# Patient Record
Sex: MALE | Race: WHITE
[De-identification: names, ages, dates, MRNs, and addresses within clinical notes are randomized per-mention and may not be internally consistent; named-entity substitution may affect disease eponyms.]

---

## 2018-09-04 PROBLEM — Z00.00 ENCOUNTER FOR PREVENTIVE HEALTH EXAMINATION: Status: ACTIVE | Noted: 2018-09-04

## 2018-10-03 LAB — HBA1C MFR BLD HPLC: 5.2

## 2018-10-04 ENCOUNTER — APPOINTMENT (OUTPATIENT)
Dept: ENDOCRINOLOGY | Facility: CLINIC | Age: 58
End: 2018-10-04
Payer: COMMERCIAL

## 2018-10-04 VITALS
DIASTOLIC BLOOD PRESSURE: 80 MMHG | BODY MASS INDEX: 44.1 KG/M2 | HEART RATE: 80 BPM | HEIGHT: 71 IN | WEIGHT: 315 LBS | SYSTOLIC BLOOD PRESSURE: 130 MMHG

## 2018-10-04 DIAGNOSIS — I10 ESSENTIAL (PRIMARY) HYPERTENSION: ICD-10-CM

## 2018-10-04 DIAGNOSIS — Z86.19 PERSONAL HISTORY OF OTHER INFECTIOUS AND PARASITIC DISEASES: ICD-10-CM

## 2018-10-04 PROCEDURE — 99214 OFFICE O/P EST MOD 30 MIN: CPT

## 2018-11-07 ENCOUNTER — MEDICATION RENEWAL (OUTPATIENT)
Age: 58
End: 2018-11-07

## 2019-01-22 ENCOUNTER — RECORD ABSTRACTING (OUTPATIENT)
Age: 59
End: 2019-01-22

## 2019-02-04 ENCOUNTER — MEDICATION RENEWAL (OUTPATIENT)
Age: 59
End: 2019-02-04

## 2019-02-06 LAB
CREAT SERPL-MCNC: 0.87
GLUCOSE SERPL-MCNC: 102
HBA1C MFR BLD HPLC: 5.4

## 2019-02-07 ENCOUNTER — APPOINTMENT (OUTPATIENT)
Dept: ENDOCRINOLOGY | Facility: CLINIC | Age: 59
End: 2019-02-07
Payer: COMMERCIAL

## 2019-02-07 VITALS
OXYGEN SATURATION: 97 % | HEART RATE: 79 BPM | HEIGHT: 71 IN | BODY MASS INDEX: 44.1 KG/M2 | WEIGHT: 315 LBS | SYSTOLIC BLOOD PRESSURE: 110 MMHG | DIASTOLIC BLOOD PRESSURE: 70 MMHG

## 2019-02-07 DIAGNOSIS — G25.0 ESSENTIAL TREMOR: ICD-10-CM

## 2019-02-07 PROCEDURE — 99214 OFFICE O/P EST MOD 30 MIN: CPT

## 2019-02-07 RX ORDER — CALCITRIOL 0.25 UG/1
0.25 CAPSULE, LIQUID FILLED ORAL
Qty: 180 | Refills: 0 | Status: DISCONTINUED | COMMUNITY
End: 2019-02-07

## 2019-04-09 ENCOUNTER — APPOINTMENT (OUTPATIENT)
Dept: ENDOCRINOLOGY | Facility: CLINIC | Age: 59
End: 2019-04-09

## 2019-05-23 ENCOUNTER — RX RENEWAL (OUTPATIENT)
Age: 59
End: 2019-05-23

## 2019-06-12 ENCOUNTER — APPOINTMENT (OUTPATIENT)
Dept: ENDOCRINOLOGY | Facility: CLINIC | Age: 59
End: 2019-06-12
Payer: COMMERCIAL

## 2019-06-12 VITALS
BODY MASS INDEX: 0.13 KG/M2 | DIASTOLIC BLOOD PRESSURE: 86 MMHG | HEART RATE: 74 BPM | HEIGHT: 60 IN | WEIGHT: 0.69 LBS | SYSTOLIC BLOOD PRESSURE: 144 MMHG

## 2019-06-12 PROCEDURE — 99214 OFFICE O/P EST MOD 30 MIN: CPT

## 2019-06-12 RX ORDER — CALCITRIOL 0.5 UG/1
0.5 CAPSULE, LIQUID FILLED ORAL TWICE DAILY
Qty: 180 | Refills: 1 | Status: DISCONTINUED | COMMUNITY
Start: 2019-02-07 | End: 2019-06-12

## 2019-09-25 ENCOUNTER — MEDICATION RENEWAL (OUTPATIENT)
Age: 59
End: 2019-09-25

## 2019-10-21 ENCOUNTER — APPOINTMENT (OUTPATIENT)
Dept: ENDOCRINOLOGY | Facility: CLINIC | Age: 59
End: 2019-10-21
Payer: COMMERCIAL

## 2019-10-21 VITALS
HEART RATE: 74 BPM | BODY MASS INDEX: 44.1 KG/M2 | WEIGHT: 315 LBS | HEIGHT: 71 IN | SYSTOLIC BLOOD PRESSURE: 124 MMHG | DIASTOLIC BLOOD PRESSURE: 80 MMHG

## 2019-10-21 PROCEDURE — 99214 OFFICE O/P EST MOD 30 MIN: CPT

## 2019-10-21 NOTE — DATA REVIEWED
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

## 2019-10-21 NOTE — PHYSICAL EXAM
[Well Nourished] : well nourished [Well Developed] : well developed [Normal Sclera/Conjunctiva] : normal sclera/conjunctiva [EOMI] : extra ocular movement intact [Well Healed Scar] : well healed scar [Clear to Auscultation] : lungs were clear to auscultation bilaterally [Normal Rate and Effort] : normal respiratory rhythm and effort [Normal Rate] : heart rate was normal  [Normal S1, S2] : normal S1 and S2 [No Edema] : there was no peripheral edema [Normal Bowel Sounds] : normal bowel sounds [Not Tender] : non-tender [Soft] : abdomen soft [Not Distended] : not distended [No CVA Tenderness] : no ~M costovertebral angle tenderness [Normal Gait] : normal gait [No Rash] : no rash [Normal Reflexes] : deep tendon reflexes were 2+ and symmetric [No Skin Lesions] : no skin lesions [Cranial Nerves Intact] : cranial nerves 2-12 were intact [Oriented x3] : oriented to person, place, and time [Normal Insight/Judgement] : insight and judgment were intact [Normal Affect] : the affect was normal [Normal Mood] : the mood was normal [de-identified] : obese male [de-identified] : negative [place] sign

## 2019-10-21 NOTE — REVIEW OF SYSTEMS
[Fatigue] : fatigue [Recent Weight Gain (___ Lbs)] : recent [unfilled] ~Ulb weight gain [As Noted in HPI] : as noted in HPI [Tremors] : tremors [Chest Pain] : no chest pain [Palpitations] : no palpitations [Shortness Of Breath] : no shortness of breath [Nausea] : no nausea [Vomiting] : no vomiting was observed [SOB on Exertion] : no shortness of breath during exertion [Diarrhea] : no diarrhea [Abdominal Pain] : no abdominal pain [Constipation] : no constipation [Polyuria] : no polyuria [Nocturia] : no nocturia [Pain/Numbness of Digits] : no pain/numbness of digits [Depression] : no depression [Anxiety] : no anxiety [Polydipsia] : no polydipsia [de-identified] : no spasm

## 2019-10-21 NOTE — ASSESSMENT
[FreeTextEntry1] : 1. Post surgical hypothyroid - euthyroid on exam, mild TSh elevation possible due to missing tabs while on [place], cont Synthroid 200 mcg daily, repeat TFTs 1 week before next visit\par 2. hypocalcemia due to hypoparathyroidism - increase TUMS 2 tabs TID, cont Calcitriol 0.25 mcg BID - he cannot tolerate higher doses\par 3. IFG - counseled pt on diet and exercise, low carb diet, cont to monitor\par 4. morbid obeisty - counseled pt on lifetsyle changes with diet and exercise. advised 30 min 5days/week of CV exercise and diet that is low incarb, high in healthy proteins and fats, increase green vegetables, risks of obesity discussed\par

## 2019-10-21 NOTE — HISTORY OF PRESENT ILLNESS
[FreeTextEntry1] : Quality: post surgical hypothyroid, post surgical hypoparathyroidism\par Duration: 2014\par Onset: thyroid nodules on physical exam, Large Left thyroid nodule 7 cm with tracheal deviation, s/p total thyroidectomy 2015\par Severity: mild\par Associated symptoms: denies anterior neck pain, dysphagia or voice changes. see ROS\par \par MODIFYING FACTORS: improved with meds\par Current medication:\par TUMS 750 mg 2 in am, 1 in lunch, 2 with dinner\par tried calcitriol 0.5 mcg BID - but his head felt hot and went back to 0.25 mcg BID\par Synthroid (TAD) 200 mcg daily on an empty stomach, admits to missing some tabs while on vacation 2 months ago

## 2019-12-30 ENCOUNTER — MEDICATION RENEWAL (OUTPATIENT)
Age: 59
End: 2019-12-30

## 2020-02-18 LAB — HBA1C MFR BLD HPLC: 5.4

## 2020-02-19 ENCOUNTER — APPOINTMENT (OUTPATIENT)
Dept: ENDOCRINOLOGY | Facility: CLINIC | Age: 60
End: 2020-02-19
Payer: COMMERCIAL

## 2020-02-19 VITALS
DIASTOLIC BLOOD PRESSURE: 82 MMHG | HEART RATE: 75 BPM | HEIGHT: 71 IN | SYSTOLIC BLOOD PRESSURE: 130 MMHG | OXYGEN SATURATION: 97 % | WEIGHT: 315 LBS | BODY MASS INDEX: 44.1 KG/M2

## 2020-02-19 PROCEDURE — 99214 OFFICE O/P EST MOD 30 MIN: CPT

## 2020-02-19 NOTE — DATA REVIEWED
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

## 2020-02-19 NOTE — HISTORY OF PRESENT ILLNESS
[FreeTextEntry1] : Quality: post surgical hypothyroid, post surgical hypoparathyroidism\par Duration: 2014\par Onset: thyroid nodules on physical exam, Large Left thyroid nodule 7 cm with tracheal deviation, s/p total thyroidectomy 2015\par Severity: mild\par Associated symptoms: denies anterior neck pain, dysphagia or voice changes. see ROS\par \par MODIFYING FACTORS: improved with meds\par Current medication:\par TUMS 750 mg 2 in am, 2 in lunch, 2 with dinner\par tried calcitriol 0.5 mcg BID - but his head felt hot and went back to 0.25 mcg BID\par Synthroid (TAD) 200 mcg daily on an empty stomach\par -------------------------------------------------------------\par Obesity - lost 2 pounds since last visit.  Exercise - nothing specific.  DIet - he cooks.  not eating as much as he used.

## 2020-02-19 NOTE — ASSESSMENT
[FreeTextEntry1] : 1. Post surgical hypothyroid - euthyroid on\par - cont Synthroid 200 mcg daily\par - repeat TFTs 1 week before next visit\par \par 2. hypocalcemia due to hypoparathyroidism - calcium improved\par - cont TUMS 2 tabs TID\par - cont Calcitriol 0.25 mcg BID (- he cannot tolerate higher doses)\par - cont to monitor labs\par - check 24 hours urine calcium\par \par 3. IFG normal A1c- counseled pt on diet and exercise, low carb diet, cont to monitor\par \par 4. morbid obeisty - counseled pt on lifetsyle changes with diet and exercise. advised 30 min 5days/week of CV exercise and diet that is low incarb, high in healthy proteins and fats, increase green vegetables, risks of obesity discussed\par

## 2020-02-19 NOTE — REVIEW OF SYSTEMS
[Fatigue] : fatigue [Recent Weight Gain (___ Lbs)] : recent [unfilled] ~Ulb weight gain [As Noted in HPI] : as noted in HPI [Tremors] : tremors [Chest Pain] : no chest pain [Palpitations] : no palpitations [Shortness Of Breath] : no shortness of breath [SOB on Exertion] : no shortness of breath during exertion [Constipation] : no constipation [Vomiting] : no vomiting was observed [Nausea] : no nausea [Diarrhea] : no diarrhea [Abdominal Pain] : no abdominal pain [Polyuria] : no polyuria [Nocturia] : no nocturia [Pain/Numbness of Digits] : no pain/numbness of digits [Depression] : no depression [Anxiety] : no anxiety [de-identified] : no spasm [Polydipsia] : no polydipsia

## 2020-02-19 NOTE — PHYSICAL EXAM
[Well Nourished] : well nourished [Well Developed] : well developed [Normal Sclera/Conjunctiva] : normal sclera/conjunctiva [EOMI] : extra ocular movement intact [Well Healed Scar] : well healed scar [Normal Rate and Effort] : normal respiratory rhythm and effort [Clear to Auscultation] : lungs were clear to auscultation bilaterally [Normal Rate] : heart rate was normal  [Normal S1, S2] : normal S1 and S2 [No Edema] : there was no peripheral edema [Normal Bowel Sounds] : normal bowel sounds [Not Tender] : non-tender [Soft] : abdomen soft [Not Distended] : not distended [No CVA Tenderness] : no ~M costovertebral angle tenderness [Normal Gait] : normal gait [No Rash] : no rash [No Skin Lesions] : no skin lesions [Cranial Nerves Intact] : cranial nerves 2-12 were intact [Normal Reflexes] : deep tendon reflexes were 2+ and symmetric [Oriented x3] : oriented to person, place, and time [Normal Insight/Judgement] : insight and judgment were intact [Normal Affect] : the affect was normal [Normal Mood] : the mood was normal [Acanthosis Nigricans] : no acanthosis nigricans [de-identified] : obese male [de-identified] : denies perioral tingling and hand spasms. (+) bilateral hand tremors

## 2020-06-24 ENCOUNTER — APPOINTMENT (OUTPATIENT)
Dept: ENDOCRINOLOGY | Facility: CLINIC | Age: 60
End: 2020-06-24
Payer: COMMERCIAL

## 2020-06-24 VITALS
WEIGHT: 315 LBS | HEIGHT: 71 IN | DIASTOLIC BLOOD PRESSURE: 80 MMHG | SYSTOLIC BLOOD PRESSURE: 130 MMHG | HEART RATE: 74 BPM | BODY MASS INDEX: 44.1 KG/M2

## 2020-06-24 PROCEDURE — 99214 OFFICE O/P EST MOD 30 MIN: CPT

## 2020-06-24 NOTE — HISTORY OF PRESENT ILLNESS
[FreeTextEntry1] : Interval Hx - no issues, no changes\par \par Quality: post surgical hypothyroid, post surgical hypoparathyroidism\par Duration: 2014\par Onset: thyroid nodules on physical exam, Large Left thyroid nodule 7 cm with tracheal deviation, s/p total thyroidectomy 2015\par Severity: mild\par Associated symptoms: . see ROS\par \par MODIFYING FACTORS: improved with meds\par Current medication:\par TUMS 750 mg 2 in am, 2 in lunch, 2 with dinner\par tried calcitriol 0.5 mcg BID - but his head felt hot and went back to 0.25 mcg BID\par Synthroid (TAD) 200 mcg daily on an empty stomach\par -------------------------------------------------------------\par Obesity - lost weight since last visit.  Exercise - nothing specific.  DIet - stopped eating out

## 2020-06-24 NOTE — REVIEW OF SYSTEMS
[Recent Weight Loss (___ Lbs)] : recent weight loss: [unfilled] lbs [Fatigue] : no fatigue [Blurred Vision] : no blurred vision [Chest Pain] : no chest pain [Shortness Of Breath] : no shortness of breath [Palpitations] : no palpitations [Nausea] : no nausea [Constipation] : no constipation [SOB on Exertion] : no shortness of breath on exertion [Abdominal Pain] : no abdominal pain [Vomiting] : no vomiting [Polyuria] : no polyuria [Nocturia] : no nocturia [Diarrhea] : no diarrhea [Tremors] : no tremors [Depression] : no depression [Anxiety] : no anxiety [Polydipsia] : no polydipsia [FreeTextEntry7] : bout of diarrhea March - April for 1-2 weeks, now resolved [FreeTextEntry9] : knee pain (chronic)

## 2020-06-24 NOTE — REASON FOR VISIT
[Hypocalcemia] : hypocalcemia [Follow - Up] : a follow-up visit [Hypothyroidism] : hypothyroidism [Weight Management/Obesity] : weight management/obesity [Other___] : [unfilled]

## 2020-06-24 NOTE — DATA REVIEWED
[FreeTextEntry1] : thyroid path - MNG, follicular lipoadenoma, no parathyriod glands\par \par Labs 10/1/18\par Cr 0.93\par Calcium 8.6\par TSH 1.29\par FT4 1.6\par A1c 5.2\par Gluc 109\par \par Labs 1/24/19\par Cr 0.87\par gluc 102, A1c 5.4\par Ca 8.5, alb 4.2, Vit D 33\par TSH 2.40, FT4 1.6\par \par Labs 5/15/19\par Ca 9, 25 vit D 28, ionized calcium 4.9, \par TSH 4.06\par A1c 5.4, gluc 100\par \par Labs 10/15/19\par Ca 9.1, ionised calcium 4.7, vit D 31\par Cr 1.02, GFR 81\par TSH 5.89, FT4 1.5\par A1c 5.3, gluc 101\par \par Labs 2/12/20\par Gluc 103, A1c 5.4\par Cr 1.00\par Ca 8.9, Vit D 26, ionized calcium 4.8\par TSH 3.30, FT4 1.8\par \par Labs 6/17/20\par Gluc 110, A1c 5.4\par Cr 1.00, GFR 82\par TSh 3.86, Ft4 1.7\par Ca 9,iPTH 12, vt D 30, ionized ca 5.1, 1,25 vut D 22

## 2020-06-24 NOTE — ASSESSMENT
[FreeTextEntry1] : 1. Post surgical hypothyroid - clinically and biochemically euthyroid \par - cont Synthroid 200 mcg daily\par - repeat TFTs 1 week before next visit\par \par 2. hypocalcemia due to hypoparathyroidism - calcium improved,vit D okay\par - cont TUMS 2 tabs TID\par - cont Calcitriol 0.25 mcg BID (- he cannot tolerate higher doses)\par - cont to monitor labs\par - check 24 hours urine calcium\par \par 3. IFG normal A1c- counseled pt on diet and exercise, low carb diet, cont to monitor\par \par 4. morbid obesity - (+) wt loss, counseled pt on lifetsyle changes with diet and exercise. cont healthy eating

## 2020-10-14 ENCOUNTER — APPOINTMENT (OUTPATIENT)
Dept: ENDOCRINOLOGY | Facility: CLINIC | Age: 60
End: 2020-10-14

## 2020-12-01 ENCOUNTER — APPOINTMENT (OUTPATIENT)
Dept: ENDOCRINOLOGY | Facility: CLINIC | Age: 60
End: 2020-12-01
Payer: COMMERCIAL

## 2020-12-01 VITALS
SYSTOLIC BLOOD PRESSURE: 132 MMHG | OXYGEN SATURATION: 98 % | HEIGHT: 71 IN | BODY MASS INDEX: 44.1 KG/M2 | WEIGHT: 315 LBS | DIASTOLIC BLOOD PRESSURE: 70 MMHG | HEART RATE: 67 BPM

## 2020-12-01 PROCEDURE — 99072 ADDL SUPL MATRL&STAF TM PHE: CPT

## 2020-12-01 PROCEDURE — 99214 OFFICE O/P EST MOD 30 MIN: CPT

## 2020-12-01 NOTE — REASON FOR VISIT
[Follow - Up] : a follow-up visit [Hypothyroidism] : hypothyroidism [Hypocalcemia] : hypocalcemia [Weight Management/Obesity] : weight management/obesity [Other___] : [unfilled]

## 2020-12-01 NOTE — ASSESSMENT
[FreeTextEntry1] : 1. Post surgical hypothyroid - clinically and biochemically euthyroid \par - cont Synthroid 200 mcg daily\par - repeat TFTs 1 week before next visit\par \par 2. hypocalcemia due to hypoparathyroidism - calcium improved,vit D okay\par - cont TUMS 2 tabs TID\par - cont Calcitriol 0.25 mcg BID  (he cannot tolerate higher doses)\par - cont to monitor labs\par - check 24 hour urine calcium\par \par 3. IFG normal A1c- counseled pt on diet and exercise, low carb diet, cont to monitor\par \par 4. morbid obesity - (+) wt loss, counseled pt on lifetsyle changes with diet and exercise. cont healthy eating, consider saxenda - discussed risks/benefits, info given

## 2020-12-01 NOTE — DATA REVIEWED
[FreeTextEntry1] : thyroid path - MNG, follicular lipoadenoma, no parathyriod glands\par \par Labs 10/1/18\par Cr 0.93\par Calcium 8.6\par TSH 1.29\par FT4 1.6\par A1c 5.2\par Gluc 109\par \par Labs 1/24/19\par Cr 0.87\par gluc 102, A1c 5.4\par Ca 8.5, alb 4.2, Vit D 33\par TSH 2.40, FT4 1.6\par \par Labs 5/15/19\par Ca 9, 25 vit D 28, ionized calcium 4.9, \par TSH 4.06\par A1c 5.4, gluc 100\par \par Labs 10/15/19\par Ca 9.1, ionised calcium 4.7, vit D 31\par Cr 1.02, GFR 81\par TSH 5.89, FT4 1.5\par A1c 5.3, gluc 101\par \par Labs 2/12/20\par Gluc 103, A1c 5.4\par Cr 1.00\par Ca 8.9, Vit D 26, ionized calcium 4.8\par TSH 3.30, FT4 1.8\par \par Labs 6/17/20\par Gluc 110, A1c 5.4\par Cr 1.00, GFR 82\par TSh 3.86, Ft4 1.7\par Ca 9,iPTH 12, vt D 30, ionized ca 5.1, 1,25 vut D 22\par \par Labs 11/25/20\par Gluc 106, A1c 5.2\par Cr 1.02, GFR 80\par TSH 2.43, Ft4 1.7\par vit A 64\par vit D 32, Ca 9.1

## 2020-12-01 NOTE — REVIEW OF SYSTEMS
[Recent Weight Loss (___ Lbs)] : recent weight loss: [unfilled] lbs [Fatigue] : no fatigue [Blurred Vision] : no blurred vision [Chest Pain] : no chest pain [Palpitations] : no palpitations [Shortness Of Breath] : no shortness of breath [SOB on Exertion] : no shortness of breath on exertion [Nausea] : no nausea [Constipation] : no constipation [Abdominal Pain] : no abdominal pain [Vomiting] : no vomiting [Diarrhea] : no diarrhea [Polyuria] : no polyuria [Nocturia] : no nocturia [Tremors] : no tremors [Depression] : no depression [Anxiety] : no anxiety [Polydipsia] : no polydipsia [FreeTextEntry9] : knee pain (chronic)

## 2020-12-01 NOTE — HISTORY OF PRESENT ILLNESS
[FreeTextEntry1] : Interval Hx - no issues, no changes\par \par Quality: post surgical hypothyroid, post surgical hypoparathyroidism\par Duration: 2014\par Onset: thyroid nodules on physical exam, Large Left thyroid nodule 7 cm with tracheal deviation, s/p total thyroidectomy 2015\par Severity: mild\par Associated symptoms: . see ROS\par \par MODIFYING FACTORS: improved with meds\par Current medication:\par TUMS 750 mg 2 in am, 2 in lunch, 2 with dinner\par tried calcitriol 0.5 mcg BID - but his head felt hot and went back to 0.25 mcg BID\par Synthroid (TAD) 200 mcg daily on an empty stomach\par -------------------------------------------------------------\par Obesity - weight stable since last visit.  Exercise - nothing specific.  DIet - stopped eating out

## 2020-12-01 NOTE — PHYSICAL EXAM
[Alert] : alert [Well Nourished] : well nourished [Obese] : obese [No Acute Distress] : no acute distress [EOMI] : extra ocular movement intact [Well Healed Scar] : well healed scar [No Respiratory Distress] : no respiratory distress [No Accessory Muscle Use] : no accessory muscle use [Clear to Auscultation] : lungs were clear to auscultation bilaterally [Normal S1, S2] : normal S1 and S2 [Normal Rate] : heart rate was normal [Normal Bowel Sounds] : normal bowel sounds [Not Tender] : non-tender [Not Distended] : not distended [Soft] : abdomen soft [Cranial Nerves Intact] : cranial nerves 2-12 were intact [Oriented x3] : oriented to person, place, and time [Normal Affect] : the affect was normal [Normal Insight/Judgement] : insight and judgment were intact [Normal Mood] : the mood was normal [de-identified] : (+) obese appearance

## 2021-03-23 ENCOUNTER — APPOINTMENT (OUTPATIENT)
Dept: ENDOCRINOLOGY | Facility: CLINIC | Age: 61
End: 2021-03-23
Payer: COMMERCIAL

## 2021-03-23 VITALS
BODY MASS INDEX: 44.1 KG/M2 | HEART RATE: 71 BPM | DIASTOLIC BLOOD PRESSURE: 70 MMHG | OXYGEN SATURATION: 97 % | SYSTOLIC BLOOD PRESSURE: 128 MMHG | WEIGHT: 315 LBS | HEIGHT: 71 IN

## 2021-03-23 LAB — HBA1C MFR BLD HPLC: 5.2

## 2021-03-23 PROCEDURE — 99214 OFFICE O/P EST MOD 30 MIN: CPT

## 2021-03-23 PROCEDURE — 99072 ADDL SUPL MATRL&STAF TM PHE: CPT

## 2021-03-23 NOTE — DATA REVIEWED
levothyroxine (SYNTHROID/LEVOTHROID) 150 MCG tablet     Last Written Prescription Date: 08/06/2017  Last Quantity: 90, # refills: 0  Last Office Visit with [facility], [facility] or [facility] prescribing provider: 05/15/2017        TSH   Date Value Ref Range Status   08/04/2017 0.11 (L) 0.40 - 4.00 mU/L Final        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\par ======================================================\par Labs 10/1/18\par Cr 0.93\par Calcium 8.6\par TSH 1.29\par FT4 1.6\par A1c 5.2\par Gluc 109\par \par Labs 1/24/19\par Cr 0.87\par gluc 102, A1c 5.4\par Ca 8.5, alb 4.2, Vit D 33\par TSH 2.40, FT4 1.6\par \par Labs 5/15/19\par Ca 9, 25 vit D 28, ionized calcium 4.9, \par TSH 4.06\par A1c 5.4, gluc 100\par \par Labs 10/15/19\par Ca 9.1, ionised calcium 4.7, vit D 31\par Cr 1.02, GFR 81\par TSH 5.89, FT4 1.5\par A1c 5.3, gluc 101\par \par Labs 2/12/20\par Gluc 103, A1c 5.4\par Cr 1.00\par Ca 8.9, Vit D 26, ionized calcium 4.8\par TSH 3.30, FT4 1.8\par \par Labs 6/17/20\par Gluc 110, A1c 5.4\par Cr 1.00, GFR 82\par TSh 3.86, Ft4 1.7\par Ca 9,iPTH 12, vt D 30, ionized ca 5.1, 1,25 vut D 22\par \par Labs 11/25/20\par Gluc 106, A1c 5.2\par Cr 1.02, GFR 80\par TSH 2.43, Ft4 1.7\par vit A 64\par vit D 32, Ca 9.1\par \par Labs 3/19/21\par A1c 5.2\par TSH 3.20, Ft4 1.5\par Vit D 27, ionized Ca 5.0\par CBC ok, no CMP\par did not do 24 hour urine Ca

## 2021-03-23 NOTE — ASSESSMENT
[FreeTextEntry1] : 1. Post surgical hypothyroid - clinically and biochemically euthyroid \par - cont Synthroid 200 mcg daily\par - repeat TFTs 1 week before next visit\par \par 2. hypocalcemia due to hypoparathyroidism - calcium improved,vit D okay\par - cont TUMS 5 tabs daily\par - cont Calcitriol 0.25 mcg BID  (he cannot tolerate higher doses)\par - cont to monitor labs\par - check 24 hour urine calcium\par \par 3. IFG normal A1c- counseled pt on diet and exercise, low carb diet, check CMP\par \par 4. morbid obesity - (+) wt gain, counseled pt on lifetsyle changes with diet and exercise. cont healthy eating, consider saxenda - discussed risks/benefits, info given

## 2021-03-23 NOTE — PHYSICAL EXAM
[Alert] : alert [Obese] : obese [No Acute Distress] : no acute distress [EOMI] : extra ocular movement intact [Well Healed Scar] : well healed scar [No Accessory Muscle Use] : no accessory muscle use [Clear to Auscultation] : lungs were clear to auscultation bilaterally [Normal S1, S2] : normal S1 and S2 [Normal Rate] : heart rate was normal [Normal Bowel Sounds] : normal bowel sounds [Not Tender] : non-tender [Not Distended] : not distended [Soft] : abdomen soft [Cranial Nerves Intact] : cranial nerves 2-12 were intact [Oriented x3] : oriented to person, place, and time [Normal Affect] : the affect was normal [Normal Insight/Judgement] : insight and judgment were intact [Normal Mood] : the mood was normal [Normal Gait] : normal gait [de-identified] : (+) obese appearance

## 2021-03-23 NOTE — HISTORY OF PRESENT ILLNESS
[FreeTextEntry1] : Interval Hx - no issues, no changes\par \par Quality: post surgical hypothyroid, post surgical hypoparathyroidism\par Duration: 2014\par Onset: thyroid nodules on physical exam, Large Left thyroid nodule 7 cm with tracheal deviation, s/p total thyroidectomy 2015\par Severity: mild\par Associated symptoms: . see ROS\par MODIFYING FACTORS: improved with meds\par Current medication:\par TUMS 750 mg 5 tabs daily: 2 in am, 1 in lunch, 2 with dinner\par tried calcitriol 0.5 mcg BID - but his head felt hot and went back to 0.25 mcg BID\par Synthroid (TAD) 200 mcg daily on an empty stomach\par -------------------------------------------------------------\par Obesity - weight gain since last visit.  Exercise - nothing specific.  DIet - stopped eating out

## 2021-03-23 NOTE — REVIEW OF SYSTEMS
[Fatigue] : no fatigue [Recent Weight Gain (___ Lbs)] : recent weight gain: [unfilled] lbs [Blurred Vision] : no blurred vision [Chest Pain] : no chest pain [Palpitations] : no palpitations [Shortness Of Breath] : no shortness of breath [SOB on Exertion] : no shortness of breath on exertion [Nausea] : no nausea [Constipation] : no constipation [Abdominal Pain] : no abdominal pain [Vomiting] : no vomiting [Diarrhea] : no diarrhea [Polyuria] : no polyuria [Nocturia] : no nocturia [Tremors] : no tremors [Depression] : no depression [Anxiety] : no anxiety [Polydipsia] : no polydipsia [FreeTextEntry9] : knee pain (chronic)

## 2021-06-29 ENCOUNTER — APPOINTMENT (OUTPATIENT)
Dept: ENDOCRINOLOGY | Facility: CLINIC | Age: 61
End: 2021-06-29

## 2021-08-10 ENCOUNTER — APPOINTMENT (OUTPATIENT)
Dept: ENDOCRINOLOGY | Facility: CLINIC | Age: 61
End: 2021-08-10
Payer: COMMERCIAL

## 2021-08-10 VITALS
DIASTOLIC BLOOD PRESSURE: 62 MMHG | SYSTOLIC BLOOD PRESSURE: 126 MMHG | WEIGHT: 315 LBS | HEART RATE: 66 BPM | OXYGEN SATURATION: 98 % | BODY MASS INDEX: 44.1 KG/M2 | HEIGHT: 71 IN

## 2021-08-10 DIAGNOSIS — E78.5 HYPERLIPIDEMIA, UNSPECIFIED: ICD-10-CM

## 2021-08-10 PROCEDURE — 99214 OFFICE O/P EST MOD 30 MIN: CPT

## 2021-08-10 RX ORDER — ATORVASTATIN CALCIUM 10 MG/1
10 TABLET, FILM COATED ORAL
Refills: 0 | Status: ACTIVE | COMMUNITY

## 2021-08-10 NOTE — PHYSICAL EXAM
[Alert] : alert [Obese] : obese [No Acute Distress] : no acute distress [EOMI] : extra ocular movement intact [Well Healed Scar] : well healed scar [No Accessory Muscle Use] : no accessory muscle use [Clear to Auscultation] : lungs were clear to auscultation bilaterally [Normal S1, S2] : normal S1 and S2 [Normal Rate] : heart rate was normal [Normal Bowel Sounds] : normal bowel sounds [Not Tender] : non-tender [Not Distended] : not distended [Soft] : abdomen soft [Normal Gait] : normal gait [Cranial Nerves Intact] : cranial nerves 2-12 were intact [Oriented x3] : oriented to person, place, and time [Normal Affect] : the affect was normal [Normal Insight/Judgement] : insight and judgment were intact [Normal Mood] : the mood was normal [de-identified] : (+) obese appearance

## 2021-08-10 NOTE — ASSESSMENT
[FreeTextEntry1] : 1. Post surgical hypothyroid - clinically  euthyroid \par - cont Synthroid 200 mcg daily\par - TFTs pending\par - repeat TFTs 1 week before next visit\par \par 2. hypocalcemia due to hypoparathyroidism - calcium improved,vit D okay\par - cont TUMS 5 tabs daily\par - cont Calcitriol 0.25 mcg BID  (he cannot tolerate higher doses)\par - cont to monitor labs, recent labs pending\par - 24 hour urine calcium level pending\par \par 3. IFG normal A1c- counseled pt on diet and exercise, low carb diet, cont to monitor\par \par 4. morbid obesity - (+) wt gain, counseled pt on lifetsyle changes with diet and exercise. cont healthy eating, pt declined GLP1 agonist

## 2021-08-10 NOTE — HISTORY OF PRESENT ILLNESS
[FreeTextEntry1] : Interval Hx - dx w hyperlipidemia and started statin\par \par Quality: post surgical hypothyroid, post surgical hypoparathyroidism\par Duration: 2014\par Onset: thyroid nodules on physical exam, Large Left thyroid nodule 7 cm with tracheal deviation, s/p total thyroidectomy 2015\par Severity: mild\par Associated symptoms: . see ROS\par MODIFYING FACTORS: improved with meds\par Current medication:\par TUMS 750 mg 5 tabs daily: 2 in am, 1 in lunch, 2 with dinner\par tried calcitriol 0.5 mcg BID - but his head felt hot and went back to 0.25 mcg BID\par Synthroid (TAD) 200 mcg daily on an empty stomach\par -------------------------------------------------------------\par Obesity - weight loss since last visit.  Exercise - nothing specific.  DIet - trying to watch diet, stopped eating out

## 2021-08-10 NOTE — DATA REVIEWED
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\par ======================================================\par Labs 10/1/18\par Cr 0.93\par Calcium 8.6\par TSH 1.29\par FT4 1.6\par A1c 5.2\par Gluc 109\par \par Labs 1/24/19\par Cr 0.87\par gluc 102, A1c 5.4\par Ca 8.5, alb 4.2, Vit D 33\par TSH 2.40, FT4 1.6\par \par Labs 5/15/19\par Ca 9, 25 vit D 28, ionized calcium 4.9, \par TSH 4.06\par A1c 5.4, gluc 100\par \par Labs 10/15/19\par Ca 9.1, ionised calcium 4.7, vit D 31\par Cr 1.02, GFR 81\par TSH 5.89, FT4 1.5\par A1c 5.3, gluc 101\par \par Labs 2/12/20\par Gluc 103, A1c 5.4\par Cr 1.00\par Ca 8.9, Vit D 26, ionized calcium 4.8\par TSH 3.30, FT4 1.8\par \par Labs 6/17/20\par Gluc 110, A1c 5.4\par Cr 1.00, GFR 82\par TSh 3.86, Ft4 1.7\par Ca 9,iPTH 12, vt D 30, ionized ca 5.1, 1,25 vut D 22\par \par Labs 11/25/20\par Gluc 106, A1c 5.2\par Cr 1.02, GFR 80\par TSH 2.43, Ft4 1.7\par vit A 64\par vit D 32, Ca 9.1\par \par Labs 3/19/21\par A1c 5.2\par TSH 3.20, Ft4 1.5\par Vit D 27, ionized Ca 5.0\par CBC ok, no CMP\par did not do 24 hour urine Ca\par \par labs pending

## 2021-08-20 ENCOUNTER — NON-APPOINTMENT (OUTPATIENT)
Age: 61
End: 2021-08-20

## 2021-11-17 ENCOUNTER — APPOINTMENT (OUTPATIENT)
Dept: ENDOCRINOLOGY | Facility: CLINIC | Age: 61
End: 2021-11-17
Payer: COMMERCIAL

## 2021-11-17 VITALS
HEART RATE: 72 BPM | BODY MASS INDEX: 44.1 KG/M2 | DIASTOLIC BLOOD PRESSURE: 80 MMHG | SYSTOLIC BLOOD PRESSURE: 126 MMHG | WEIGHT: 315 LBS | HEIGHT: 71 IN

## 2021-11-17 PROCEDURE — 99214 OFFICE O/P EST MOD 30 MIN: CPT

## 2021-11-17 NOTE — PHYSICAL EXAM
[Alert] : alert [Obese] : obese [No Acute Distress] : no acute distress [EOMI] : extra ocular movement intact [Well Healed Scar] : well healed scar [No Accessory Muscle Use] : no accessory muscle use [Clear to Auscultation] : lungs were clear to auscultation bilaterally [Normal S1, S2] : normal S1 and S2 [Normal Rate] : heart rate was normal [Normal Bowel Sounds] : normal bowel sounds [Not Tender] : non-tender [Not Distended] : not distended [Soft] : abdomen soft [Normal Gait] : normal gait [Cranial Nerves Intact] : cranial nerves 2-12 were intact [Oriented x3] : oriented to person, place, and time [Normal Affect] : the affect was normal [Normal Insight/Judgement] : insight and judgment were intact [Normal Mood] : the mood was normal [No LAD] : no lymphadenopathy [de-identified] : (+) obese appearance

## 2021-11-17 NOTE — ASSESSMENT
[FreeTextEntry1] : 1. Post surgical hypothyroid - clinically euthyroid, normal TSH\par - cont Synthroid 200 mcg daily\par - repeat TFTs 1 week before next visit\par \par 2. hypocalcemia due to hypoparathyroidism - calcium improved,vit D okay\par - cont TUMS 5 tabs daily\par - cont Calcitriol 0.25 mcg BID  (he cannot tolerate higher doses)\par - cont to monitor labs\par - 24 hour urine calcium level normal\par \par 3. IFG normal A1c- counseled pt on diet and exercise, low carb diet, cont to monitor\par \par 4. morbid obesity - (+) wt gain, counseled pt on lifetstyle changes with diet and exercise., pt declined GLP1 agonist

## 2021-11-17 NOTE — DATA REVIEWED
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\par ======================================================\par Labs 10/1/18\par Cr 0.93\par Calcium 8.6\par TSH 1.29\par FT4 1.6\par A1c 5.2\par Gluc 109\par \par Labs 1/24/19\par Cr 0.87\par gluc 102, A1c 5.4\par Ca 8.5, alb 4.2, Vit D 33\par TSH 2.40, FT4 1.6\par \par Labs 5/15/19\par Ca 9, 25 vit D 28, ionized calcium 4.9, \par TSH 4.06\par A1c 5.4, gluc 100\par \par Labs 10/15/19\par Ca 9.1, ionised calcium 4.7, vit D 31\par Cr 1.02, GFR 81\par TSH 5.89, FT4 1.5\par A1c 5.3, gluc 101\par \par Labs 2/12/20\par Gluc 103, A1c 5.4\par Cr 1.00\par Ca 8.9, Vit D 26, ionized calcium 4.8\par TSH 3.30, FT4 1.8\par \par Labs 6/17/20\par Gluc 110, A1c 5.4\par Cr 1.00, GFR 82\par TSh 3.86, Ft4 1.7\par Ca 9,iPTH 12, vt D 30, ionized ca 5.1, 1,25 vut D 22\par \par Labs 11/25/20\par Gluc 106, A1c 5.2\par Cr 1.02, GFR 80\par TSH 2.43, Ft4 1.7\par vit A 64\par vit D 32, Ca 9.1\par \par Labs 3/19/21\par A1c 5.2\par TSH 3.20, Ft4 1.5\par Vit D 27, ionized Ca 5.0\par CBC ok, no CMP\par did not do 24 hour urine Ca\par \par labs 8/2021 24 hour urine Ca 147\par \par Labs 11/12/21\par Gluc 106\par Cr 0.93, GFR 89\par Ca 8.8, iPTH 13, Mg 1.8, Phos 4.2\par TSH 1.69, Ft4 1.8\par 25 vit D 30\par 1,25 vit D 12

## 2021-11-17 NOTE — HISTORY OF PRESENT ILLNESS
[FreeTextEntry1] : Interval Hx - no issues no changes\par \par Quality: post surgical hypothyroid, post surgical hypoparathyroidism\par Duration: 2014\par Onset: thyroid nodules on physical exam, Large Left thyroid nodule 7 cm with tracheal deviation, s/p total thyroidectomy 2015\par Severity: mild\par Associated symptoms: . see ROS\par MODIFYING FACTORS: improved with meds\par Current medication:\par TUMS 750 mg 5 tabs daily: 2 in am, 1 in lunch, 2 with dinner\par tried calcitriol 0.5 mcg BID - but his head felt hot and went back to 0.25 mcg BID\par Synthroid (TAD) 200 mcg daily on an empty stomach\par -------------------------------------------------------------\par Obesity - weight gain since last visit.  Exercise - nothing specific.  DIet - trying to watch diet, stopped eating out

## 2022-03-16 ENCOUNTER — APPOINTMENT (OUTPATIENT)
Dept: ENDOCRINOLOGY | Facility: CLINIC | Age: 62
End: 2022-03-16
Payer: COMMERCIAL

## 2022-03-16 VITALS
WEIGHT: 315 LBS | HEART RATE: 68 BPM | SYSTOLIC BLOOD PRESSURE: 132 MMHG | DIASTOLIC BLOOD PRESSURE: 80 MMHG | HEIGHT: 71 IN | OXYGEN SATURATION: 99 % | BODY MASS INDEX: 44.1 KG/M2

## 2022-03-16 PROCEDURE — 99214 OFFICE O/P EST MOD 30 MIN: CPT

## 2022-03-16 NOTE — HISTORY OF PRESENT ILLNESS
[FreeTextEntry1] : Interval Hx - no issues no changes\par \par Quality: post surgical hypothyroid, post surgical hypoparathyroidism\par Duration: 2014\par Onset: thyroid nodules on physical exam, Large Left thyroid nodule 7 cm with tracheal deviation, s/p total thyroidectomy 2015\par Severity: mild\par Associated symptoms: . see ROS\par MODIFYING FACTORS: improved with meds\par Current medication:\par TUMS 750 mg 5 tabs daily: 2 in am, 1 in lunch, 2 with dinner (sometimes forget a dose)\par tried calcitriol 0.5 mcg BID - but his head felt hot and went back to 0.25 mcg BID\par Synthroid (TAD) 200 mcg daily on an empty stomach\par -------------------------------------------------------------\par Obesity - weight stable since last visit.  Exercise - nothing specific.  DIet - trying to watch diet, stopped eating out

## 2022-03-16 NOTE — PHYSICAL EXAM
[Alert] : alert [Obese] : obese [No LAD] : no lymphadenopathy [Well Healed Scar] : well healed scar [No Accessory Muscle Use] : no accessory muscle use [Clear to Auscultation] : lungs were clear to auscultation bilaterally [Normal S1, S2] : normal S1 and S2 [Normal Rate] : heart rate was normal [No Edema] : no peripheral edema [Not Tender] : non-tender [Soft] : abdomen soft [Oriented x3] : oriented to person, place, and time [Normal Affect] : the affect was normal [Normal Insight/Judgement] : insight and judgment were intact [Normal Mood] : the mood was normal [de-identified] : (+) bilateral hand tremors

## 2022-03-16 NOTE — ASSESSMENT
[FreeTextEntry1] : 1. Post surgical hypothyroid - clinically euthyroid, normal TSH\par - cont Synthroid 200 mcg daily\par - repeat TFTs 1 week before next visit\par \par 2. hypocalcemia due to hypoparathyroidism - calcium at goal\par - cont TUMS 5 tabs daily\par - cont Calcitriol 0.25 mcg BID  (he cannot tolerate higher doses)\par - cont to monitor labs\par - 8/2021 24 hour urine calcium level normal, repeat level later this year\par \par 3. IFG with normal A1c- counseled pt on diet and exercise, low carb diet, cont to monitor\par \par 4. morbid obesity -  counseled pt on lifetstyle changes with diet and exercise. discussed various dietoptions - weight watcher, nutrisystem, calorie counting etc.  counseled pt on increased physical activity. pt declined GLP1 agonist

## 2022-03-16 NOTE — REVIEW OF SYSTEMS
[Recent Weight Gain (___ Lbs)] : no recent weight gain [Recent Weight Loss (___ Lbs)] : no recent weight loss [Blurred Vision] : no blurred vision [Chest Pain] : no chest pain [Palpitations] : no palpitations [Shortness Of Breath] : no shortness of breath [SOB on Exertion] : no shortness of breath on exertion [Nausea] : no nausea [Constipation] : constipation [Abdominal Pain] : no abdominal pain [Vomiting] : no vomiting [Polyuria] : no polyuria [Nocturia] : no nocturia [Dizziness] : no dizziness [Tremors] : tremors [Pain/Numbness of Digits] : no pain/numbness of digits [Depression] : no depression [Anxiety] : no anxiety [Polydipsia] : no polydipsia

## 2022-03-16 NOTE — DATA REVIEWED
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\par ======================================================\par Labs 10/1/18\par Cr 0.93\par Calcium 8.6\par TSH 1.29\par FT4 1.6\par A1c 5.2\par Gluc 109\par \par Labs 1/24/19\par Cr 0.87\par gluc 102, A1c 5.4\par Ca 8.5, alb 4.2, Vit D 33\par TSH 2.40, FT4 1.6\par \par Labs 5/15/19\par Ca 9, 25 vit D 28, ionized calcium 4.9, \par TSH 4.06\par A1c 5.4, gluc 100\par \par Labs 10/15/19\par Ca 9.1, ionised calcium 4.7, vit D 31\par Cr 1.02, GFR 81\par TSH 5.89, FT4 1.5\par A1c 5.3, gluc 101\par \par Labs 2/12/20\par Gluc 103, A1c 5.4\par Cr 1.00\par Ca 8.9, Vit D 26, ionized calcium 4.8\par TSH 3.30, FT4 1.8\par \par Labs 6/17/20\par Gluc 110, A1c 5.4\par Cr 1.00, GFR 82\par TSh 3.86, Ft4 1.7\par Ca 9,iPTH 12, vt D 30, ionized ca 5.1, 1,25 vut D 22\par \par Labs 11/25/20\par Gluc 106, A1c 5.2\par Cr 1.02, GFR 80\par TSH 2.43, Ft4 1.7\par vit A 64\par vit D 32, Ca 9.1\par \par Labs 3/19/21\par A1c 5.2\par TSH 3.20, Ft4 1.5\par Vit D 27, ionized Ca 5.0\par CBC ok, no CMP\par did not do 24 hour urine Ca\par \par labs 8/2021 24 hour urine Ca 147\par \par Labs 11/12/21\par Gluc 106\par Cr 0.93, GFR 89\par Ca 8.8, iPTH 13, Mg 1.8, Phos 4.2\par TSH 1.69, Ft4 1.8\par 25 vit D 30\par 1,25 vit D 12\par \par Labs 3/8/22\par Gluc 102, A1c 5.2\par Cr 0.92, GFR 89\par Ca 8.7, alb 4.4\par TSH 1.56, Ft4 1.8

## 2022-07-19 ENCOUNTER — APPOINTMENT (OUTPATIENT)
Dept: ENDOCRINOLOGY | Facility: CLINIC | Age: 62
End: 2022-07-19

## 2022-07-19 VITALS
SYSTOLIC BLOOD PRESSURE: 144 MMHG | BODY MASS INDEX: 44.1 KG/M2 | DIASTOLIC BLOOD PRESSURE: 66 MMHG | HEART RATE: 81 BPM | WEIGHT: 315 LBS | OXYGEN SATURATION: 98 % | HEIGHT: 71 IN

## 2022-07-19 PROCEDURE — 99214 OFFICE O/P EST MOD 30 MIN: CPT

## 2022-07-19 NOTE — DATA REVIEWED
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\par ======================================================\par Labs 10/1/18\par Cr 0.93\par Calcium 8.6\par TSH 1.29\par FT4 1.6\par A1c 5.2\par Gluc 109\par \par Labs 1/24/19\par Cr 0.87\par gluc 102, A1c 5.4\par Ca 8.5, alb 4.2, Vit D 33\par TSH 2.40, FT4 1.6\par \par Labs 5/15/19\par Ca 9, 25 vit D 28, ionized calcium 4.9, \par TSH 4.06\par A1c 5.4, gluc 100\par \par Labs 10/15/19\par Ca 9.1, ionised calcium 4.7, vit D 31\par Cr 1.02, GFR 81\par TSH 5.89, FT4 1.5\par A1c 5.3, gluc 101\par \par Labs 2/12/20\par Gluc 103, A1c 5.4\par Cr 1.00\par Ca 8.9, Vit D 26, ionized calcium 4.8\par TSH 3.30, FT4 1.8\par \par Labs 6/17/20\par Gluc 110, A1c 5.4\par Cr 1.00, GFR 82\par TSh 3.86, Ft4 1.7\par Ca 9,iPTH 12, vt D 30, ionized ca 5.1, 1,25 vut D 22\par \par Labs 11/25/20\par Gluc 106, A1c 5.2\par Cr 1.02, GFR 80\par TSH 2.43, Ft4 1.7\par vit A 64\par vit D 32, Ca 9.1\par \par Labs 3/19/21\par A1c 5.2\par TSH 3.20, Ft4 1.5\par Vit D 27, ionized Ca 5.0\par CBC ok, no CMP\par did not do 24 hour urine Ca\par \par labs 8/2021 24 hour urine Ca 147\par \par Labs 11/12/21\par Gluc 106\par Cr 0.93, GFR 89\par Ca 8.8, iPTH 13, Mg 1.8, Phos 4.2\par TSH 1.69, Ft4 1.8\par 25 vit D 30\par 1,25 vit D 12\par \par Labs 3/8/22\par Gluc 102, A1c 5.2\par Cr 0.92, GFR 89\par Ca 8.7, alb 4.4\par TSH 1.56, Ft4 1.8\par \par Labs 7/1/22\par Gluc 108\par Cr 0.96, GFR 85\par TSH 1.95, FT4 1.7\par Ca 8.7, alb 4.1

## 2022-07-19 NOTE — REVIEW OF SYSTEMS
[Fatigue] : no fatigue [Recent Weight Gain (___ Lbs)] : no recent weight gain [Recent Weight Loss (___ Lbs)] : no recent weight loss [Chest Pain] : no chest pain [Shortness Of Breath] : no shortness of breath [SOB on Exertion] : no shortness of breath on exertion [Nausea] : no nausea [Constipation] : no constipation [Abdominal Pain] : no abdominal pain [Diarrhea] : no diarrhea [Polyuria] : no polyuria [Nocturia] : no nocturia [Headaches] : no headaches [Dizziness] : no dizziness [Tremors] : tremors [Pain/Numbness of Digits] : no pain/numbness of digits [Polydipsia] : no polydipsia

## 2022-07-19 NOTE — HISTORY OF PRESENT ILLNESS
[FreeTextEntry1] : Interval Hx - no issues no changes\par \par Quality: post surgical hypothyroid, post surgical hypoparathyroidism\par Duration: 2014\par Onset: thyroid nodules on physical exam, Large Left thyroid nodule 7 cm with tracheal deviation, s/p total thyroidectomy 2015\par Severity: mild\par Associated symptoms: . see ROS\par MODIFYING FACTORS: improved with meds\par Current medication:\par TUMS 750 mg 5 tabs daily: 2 in am, 1 in lunch, 2 with dinner (sometimes forget a dose)\par tried calcitriol 0.5 mcg BID - but his head felt hot and went back to 0.25 mcg BID\par Synthroid (TAD) 200 mcg daily on an empty stomach\par -------------------------------------------------------------\par Obesity - weight stable since last visit. no diet, trying to be more active

## 2022-07-19 NOTE — PHYSICAL EXAM
[Alert] : alert [Obese] : obese [EOMI] : extra ocular movement intact [No LAD] : no lymphadenopathy [No Thyroid Nodules] : no palpable thyroid nodules [No Accessory Muscle Use] : no accessory muscle use [Clear to Auscultation] : lungs were clear to auscultation bilaterally [Normal S1, S2] : normal S1 and S2 [Normal Rate] : heart rate was normal [No Edema] : no peripheral edema [Not Tender] : non-tender [Soft] : abdomen soft [Oriented x3] : oriented to person, place, and time [Normal Affect] : the affect was normal [Normal Insight/Judgement] : insight and judgment were intact [Normal Mood] : the mood was normal

## 2022-07-19 NOTE — ASSESSMENT
[FreeTextEntry1] : 1. Post surgical hypothyroid - clinically euthyroid, normal TSH\par - cont Synthroid 200 mcg daily\par - repeat TFTs 1 week before next visit\par \par 2. hypocalcemia due to hypoparathyroidism - calcium at goal\par - cont TUMS 5 tabs daily\par - cont Calcitriol 0.25 mcg BID  (he cannot tolerate higher doses)\par - cont to monitor labs\par - 8/2021 24 hour urine calcium level normal, repeat level pending at lab\par \par 3. IFG with normal A1c- counseled pt on diet and exercise, low carb diet, cont to monitor\par \par 4. morbid obesity -  counseled pt on lifetstyle changes with diet and exercise. discussed various dietoptions - weight watcher, nutrisystem, calorie counting etc.  counseled pt on increased physical activity. pt declined GLP1 agonist

## 2022-07-21 ENCOUNTER — NON-APPOINTMENT (OUTPATIENT)
Age: 62
End: 2022-07-21

## 2022-09-09 ENCOUNTER — RX RENEWAL (OUTPATIENT)
Age: 62
End: 2022-09-09

## 2023-01-09 LAB — TSH SERPL-ACNC: 2.18

## 2023-01-10 ENCOUNTER — APPOINTMENT (OUTPATIENT)
Dept: ENDOCRINOLOGY | Facility: CLINIC | Age: 63
End: 2023-01-10
Payer: COMMERCIAL

## 2023-01-10 VITALS
DIASTOLIC BLOOD PRESSURE: 70 MMHG | SYSTOLIC BLOOD PRESSURE: 130 MMHG | WEIGHT: 315 LBS | HEART RATE: 71 BPM | HEIGHT: 71 IN | BODY MASS INDEX: 44.1 KG/M2

## 2023-01-10 PROCEDURE — 99214 OFFICE O/P EST MOD 30 MIN: CPT

## 2023-01-10 RX ORDER — CALCIUM CARBONATE 300MG(750)
750 TABLET,CHEWABLE ORAL
Refills: 0 | Status: ACTIVE | COMMUNITY

## 2023-01-10 NOTE — DATA REVIEWED
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\par ======================================================\par Labs 10/1/18\par Cr 0.93\par Calcium 8.6\par TSH 1.29\par FT4 1.6\par A1c 5.2\par Gluc 109\par \par Labs 1/24/19\par Cr 0.87\par gluc 102, A1c 5.4\par Ca 8.5, alb 4.2, Vit D 33\par TSH 2.40, FT4 1.6\par \par Labs 5/15/19\par Ca 9, 25 vit D 28, ionized calcium 4.9, \par TSH 4.06\par A1c 5.4, gluc 100\par \par Labs 10/15/19\par Ca 9.1, ionised calcium 4.7, vit D 31\par Cr 1.02, GFR 81\par TSH 5.89, FT4 1.5\par A1c 5.3, gluc 101\par \par Labs 2/12/20\par Gluc 103, A1c 5.4\par Cr 1.00\par Ca 8.9, Vit D 26, ionized calcium 4.8\par TSH 3.30, FT4 1.8\par \par Labs 6/17/20\par Gluc 110, A1c 5.4\par Cr 1.00, GFR 82\par TSh 3.86, Ft4 1.7\par Ca 9,iPTH 12, vt D 30, ionized ca 5.1, 1,25 vut D 22\par \par Labs 11/25/20\par Gluc 106, A1c 5.2\par Cr 1.02, GFR 80\par TSH 2.43, Ft4 1.7\par vit A 64\par vit D 32, Ca 9.1\par \par Labs 3/19/21\par A1c 5.2\par TSH 3.20, Ft4 1.5\par Vit D 27, ionized Ca 5.0\par CBC ok, no CMP\par did not do 24 hour urine Ca\par \par labs 8/2021 24 hour urine Ca 147\par \par Labs 11/12/21\par Gluc 106\par Cr 0.93, GFR 89\par Ca 8.8, iPTH 13, Mg 1.8, Phos 4.2\par TSH 1.69, Ft4 1.8\par 25 vit D 30\par 1,25 vit D 12\par \par Labs 3/8/22\par Gluc 102, A1c 5.2\par Cr 0.92, GFR 89\par Ca 8.7, alb 4.4\par TSH 1.56, Ft4 1.8\par \par Labs 7/1/22\par Gluc 108\par Cr 0.96, GFR 85\par TSH 1.95, FT4 1.7\par Ca 8.7, alb 4.1\par \par Labs 7/14/22 24 hours urine calcium 304\par \par Labs 1/5/23\par gluc 103, A1c 5.2\par TSH 2.18, FT4 1.7\par Ca 9.0, alb 4.2

## 2023-01-10 NOTE — PHYSICAL EXAM
[Alert] : alert [Obese] : obese [EOMI] : extra ocular movement intact [No LAD] : no lymphadenopathy [No Thyroid Nodules] : no palpable thyroid nodules [Clear to Auscultation] : lungs were clear to auscultation bilaterally [Normal S1, S2] : normal S1 and S2 [Normal Rate] : heart rate was normal [No Edema] : no peripheral edema [Not Tender] : non-tender [Soft] : abdomen soft [Oriented x3] : oriented to person, place, and time [Normal Affect] : the affect was normal [Normal Insight/Judgement] : insight and judgment were intact [Normal Mood] : the mood was normal [No Respiratory Distress] : no respiratory distress

## 2023-01-10 NOTE — REVIEW OF SYSTEMS
[Tremors] : tremors [Fatigue] : no fatigue [Recent Weight Gain (___ Lbs)] : no recent weight gain [Recent Weight Loss (___ Lbs)] : no recent weight loss [Chest Pain] : no chest pain [Shortness Of Breath] : no shortness of breath [SOB on Exertion] : no shortness of breath on exertion [Nausea] : no nausea [Constipation] : no constipation [Abdominal Pain] : no abdominal pain [Diarrhea] : no diarrhea [Polyuria] : no polyuria [Nocturia] : no nocturia [Headaches] : no headaches [Dizziness] : no dizziness [Pain/Numbness of Digits] : no pain/numbness of digits [Polydipsia] : no polydipsia

## 2023-01-10 NOTE — HISTORY OF PRESENT ILLNESS
[FreeTextEntry1] : Interval Hx - no issues today,  no changes\par \par Quality: post surgical hypothyroid, post surgical hypoparathyroidism\par Duration: 2014\par Onset: thyroid nodules on physical exam, Large Left thyroid nodule 7 cm with tracheal deviation, s/p total thyroidectomy 2015\par Severity: mild\par Associated symptoms: . see ROS\par MODIFYING FACTORS: improved with meds\par Current medication:\par TUMS 750 mg 3 tabs daily: 1 tab w meals (reduced dose since last visit, 7/2022, due to elevtaed urine calcium level)\par tried calcitriol 0.5 mcg BID - but his head felt hot and went back to 0.25 mcg BID\par Synthroid (TAD) 200 mcg daily on an empty stomach\par -------------------------------------------------------------\par Obesity - weight stable since last visit. no diet, trying to be more active

## 2023-01-10 NOTE — ASSESSMENT
[FreeTextEntry1] : 1. Post surgical hypothyroid - clinically euthyroid, normal TSH\par - cont Synthroid 200 mcg daily\par - repeat TFTs 1 week before next visit\par \par 2. hypocalcemia due to hypoparathyroidism - goal Ca 8-8.5 to avoid hypercalciuria/stones.  calcium elevated\par - decrease TUMS 2 tabs daily\par - cont Calcitriol 0.25 mcg BID  (he cannot tolerate higher doses)\par - cont to monitor labs, repeat labs and 24 hour urine calcium in 1 month\par \par 3. IFG with normal A1c- counseled pt on diet and exercise, low carb diet, cont to monitor\par \par 4. morbid obesity -  counseled pt on lifetstyle changes with diet and exercise. discussed various diet options - weight watcher, nutrisystem, calorie counting etc.  counseled pt on increased physical activity. pt declined GLP1 agonist in past

## 2023-03-24 ENCOUNTER — NON-APPOINTMENT (OUTPATIENT)
Age: 63
End: 2023-03-24

## 2023-07-24 LAB — TSH SERPL-ACNC: 2.25

## 2023-07-25 ENCOUNTER — APPOINTMENT (OUTPATIENT)
Dept: ENDOCRINOLOGY | Facility: CLINIC | Age: 63
End: 2023-07-25
Payer: COMMERCIAL

## 2023-07-25 VITALS
HEART RATE: 80 BPM | WEIGHT: 315 LBS | BODY MASS INDEX: 44.1 KG/M2 | SYSTOLIC BLOOD PRESSURE: 118 MMHG | OXYGEN SATURATION: 95 % | HEIGHT: 71 IN | DIASTOLIC BLOOD PRESSURE: 78 MMHG

## 2023-07-25 DIAGNOSIS — E83.51 HYPOCALCEMIA: ICD-10-CM

## 2023-07-25 PROCEDURE — 99214 OFFICE O/P EST MOD 30 MIN: CPT

## 2023-07-25 NOTE — HISTORY OF PRESENT ILLNESS
[FreeTextEntry1] : Interval Hx - no issues today,  no changes\par \par Quality: post surgical hypothyroid, post surgical hypoparathyroidism\par Duration: 2014\par Onset: thyroid nodules on physical exam, Large Left thyroid nodule 7 cm with tracheal deviation, s/p total thyroidectomy 2015\par Severity: mild\par Associated symptoms: . see ROS\par MODIFYING FACTORS: improved with meds\par Current medication:\par TUMS 750 mg 1 tab daily\par tried calcitriol 0.5 mcg BID - but his head felt hot and went back to 0.25 mcg BID\par Synthroid (TAD) 200 mcg daily on an empty stomach\par -------------------------------------------------------------\par Obesity - weight stable since last visit. no diet, trying to be more active

## 2023-07-25 NOTE — ASSESSMENT
[FreeTextEntry1] : 1. Post surgical hypothyroid - clinically euthyroid, normal TSH\par - cont Synthroid 200 mcg daily\par - repeat TFTs 1 week before next visit\par \par 2. hypocalcemia due to hypoparathyroidism - goal Ca 8-8.5 to avoid hypercalciuria/stones. \par - cont TUMS 1 tabs daily\par - cont Calcitriol 0.25 mcg BID  (he cannot tolerate higher doses)\par - cont to monitor labs, yeraly 24 hour urine calcium \par \par 3. IFG with normal A1c- counseled pt on diet and exercise, low carb diet, cont to monitor\par \par 4. morbid obesity -  counseled pt on lifetstyle changes with diet and exercise. discussed various diet options - weight watcher, nutrisystem, calorie counting etc.  counseled pt on increased physical activity. he\par  declined GLP1 agonist at this time

## 2023-07-25 NOTE — DATA REVIEWED
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\par ======================================================\par Labs 10/1/18\par Cr 0.93\par Calcium 8.6\par TSH 1.29\par FT4 1.6\par A1c 5.2\par Gluc 109\par \par Labs 1/24/19\par Cr 0.87\par gluc 102, A1c 5.4\par Ca 8.5, alb 4.2, Vit D 33\par TSH 2.40, FT4 1.6\par \par Labs 5/15/19\par Ca 9, 25 vit D 28, ionized calcium 4.9, \par TSH 4.06\par A1c 5.4, gluc 100\par \par Labs 10/15/19\par Ca 9.1, ionised calcium 4.7, vit D 31\par Cr 1.02, GFR 81\par TSH 5.89, FT4 1.5\par A1c 5.3, gluc 101\par \par Labs 2/12/20\par Gluc 103, A1c 5.4\par Cr 1.00\par Ca 8.9, Vit D 26, ionized calcium 4.8\par TSH 3.30, FT4 1.8\par \par Labs 6/17/20\par Gluc 110, A1c 5.4\par Cr 1.00, GFR 82\par TSh 3.86, Ft4 1.7\par Ca 9,iPTH 12, vt D 30, ionized ca 5.1, 1,25 vut D 22\par \par Labs 11/25/20\par Gluc 106, A1c 5.2\par Cr 1.02, GFR 80\par TSH 2.43, Ft4 1.7\par vit A 64\par vit D 32, Ca 9.1\par \par Labs 3/19/21\par A1c 5.2\par TSH 3.20, Ft4 1.5\par Vit D 27, ionized Ca 5.0\par CBC ok, no CMP\par did not do 24 hour urine Ca\par \par labs 8/2021 24 hour urine Ca 147\par \par Labs 11/12/21\par Gluc 106\par Cr 0.93, GFR 89\par Ca 8.8, iPTH 13, Mg 1.8, Phos 4.2\par TSH 1.69, Ft4 1.8\par 25 vit D 30\par 1,25 vit D 12\par \par Labs 3/8/22\par Gluc 102, A1c 5.2\par Cr 0.92, GFR 89\par Ca 8.7, alb 4.4\par TSH 1.56, Ft4 1.8\par \par Labs 7/1/22\par Gluc 108\par Cr 0.96, GFR 85\par TSH 1.95, FT4 1.7\par Ca 8.7, alb 4.1\par \par Labs 7/14/22 24 hours urine calcium 304\par \par Labs 1/5/23\par gluc 103, A1c 5.2\par TSH 2.18, FT4 1.7\par Ca 9.0, alb 4.2\par \par Labs 7/13/23\par Gluc 105, A1c 5.2\par Ca 8.6, alb 4.2\par GFR 97\par 24 hour urine ca 278\par TSH 2.24, Ft4 1,4

## 2023-07-25 NOTE — PHYSICAL EXAM
[Alert] : alert [Obese] : obese [EOMI] : extra ocular movement intact [No LAD] : no lymphadenopathy [No Thyroid Nodules] : no palpable thyroid nodules [No Respiratory Distress] : no respiratory distress [Clear to Auscultation] : lungs were clear to auscultation bilaterally [Normal S1, S2] : normal S1 and S2 [Normal Rate] : heart rate was normal [No Edema] : no peripheral edema [Not Tender] : non-tender [Soft] : abdomen soft [Oriented x3] : oriented to person, place, and time [Normal Affect] : the affect was normal [Normal Insight/Judgement] : insight and judgment were intact [Normal Mood] : the mood was normal

## 2024-02-01 RX ORDER — LEVOTHYROXINE SODIUM 0.2 MG/1
200 TABLET ORAL
Qty: 90 | Refills: 1 | Status: ACTIVE | COMMUNITY
Start: 1900-01-01 | End: 1900-01-01

## 2024-02-01 RX ORDER — CALCITRIOL 0.25 UG/1
0.25 CAPSULE, LIQUID FILLED ORAL
Qty: 180 | Refills: 1 | Status: ACTIVE | COMMUNITY
Start: 2019-06-12 | End: 1900-01-01

## 2024-02-29 LAB — TSH SERPL-ACNC: 1.64

## 2024-03-01 ENCOUNTER — APPOINTMENT (OUTPATIENT)
Dept: ENDOCRINOLOGY | Facility: CLINIC | Age: 64
End: 2024-03-01
Payer: COMMERCIAL

## 2024-03-01 VITALS
DIASTOLIC BLOOD PRESSURE: 84 MMHG | SYSTOLIC BLOOD PRESSURE: 126 MMHG | HEART RATE: 85 BPM | WEIGHT: 315 LBS | HEIGHT: 71 IN | BODY MASS INDEX: 44.1 KG/M2

## 2024-03-01 DIAGNOSIS — E89.0 POSTPROCEDURAL HYPOTHYROIDISM: ICD-10-CM

## 2024-03-01 DIAGNOSIS — R73.01 IMPAIRED FASTING GLUCOSE: ICD-10-CM

## 2024-03-01 DIAGNOSIS — E20.9 HYPOPARATHYROIDISM, UNSPECIFIED: ICD-10-CM

## 2024-03-01 DIAGNOSIS — E66.01 MORBID (SEVERE) OBESITY DUE TO EXCESS CALORIES: ICD-10-CM

## 2024-03-01 PROCEDURE — 99214 OFFICE O/P EST MOD 30 MIN: CPT

## 2024-03-01 RX ORDER — LISINOPRIL 40 MG/1
40 TABLET ORAL
Qty: 90 | Refills: 0 | Status: ACTIVE | COMMUNITY

## 2024-03-01 RX ORDER — QUINAPRIL HYDROCHLORIDE 40 MG/1
40 TABLET, FILM COATED ORAL DAILY
Refills: 0 | Status: DISCONTINUED | COMMUNITY
End: 2024-03-01

## 2024-03-01 RX ORDER — METOPROLOL SUCCINATE 25 MG/1
25 TABLET, EXTENDED RELEASE ORAL DAILY
Refills: 0 | Status: ACTIVE | COMMUNITY

## 2024-03-01 NOTE — HISTORY OF PRESENT ILLNESS
[FreeTextEntry1] : Interval Hx - no issues today. limited exercise due to back pains, not following diet.  Quality: post surgical hypothyroid, post surgical hypoparathyroidism Duration: 2014 Onset: thyroid nodules on physical exam, Large Left thyroid nodule 7 cm with tracheal deviation, s/p total thyroidectomy 2015 Severity: mild Associated symptoms: . see ROS MODIFYING FACTORS: improved with meds Current medication: TUMS 750 mg 1 tab daily  calcitriol  0.25 mcg BID  Synthroid (TAD) 200 mcg daily on an empty stomach ------------------------------------------------------------- Obesity - weight stable since last visit. he cooks meals, low carbs at times. snacks on peanuts.

## 2024-03-01 NOTE — DATA REVIEWED
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 ====================================================== Labs 10/1/18 Cr 0.93 Calcium 8.6 TSH 1.29 FT4 1.6 A1c 5.2 Gluc 109  Labs 1/24/19 Cr 0.87 gluc 102, A1c 5.4 Ca 8.5, alb 4.2, Vit D 33 TSH 2.40, FT4 1.6  Labs 5/15/19 Ca 9, 25 vit D 28, ionized calcium 4.9,  TSH 4.06 A1c 5.4, gluc 100  Labs 10/15/19 Ca 9.1, ionised calcium 4.7, vit D 31 Cr 1.02, GFR 81 TSH 5.89, FT4 1.5 A1c 5.3, gluc 101  Labs 2/12/20 Gluc 103, A1c 5.4 Cr 1.00 Ca 8.9, Vit D 26, ionized calcium 4.8 TSH 3.30, FT4 1.8  Labs 6/17/20 Gluc 110, A1c 5.4 Cr 1.00, GFR 82 TSh 3.86, Ft4 1.7 Ca 9,iPTH 12, vt D 30, ionized ca 5.1, 1,25 vut D 22  Labs 11/25/20 Gluc 106, A1c 5.2 Cr 1.02, GFR 80 TSH 2.43, Ft4 1.7 vit A 64 vit D 32, Ca 9.1  Labs 3/19/21 A1c 5.2 TSH 3.20, Ft4 1.5 Vit D 27, ionized Ca 5.0 CBC ok, no CMP did not do 24 hour urine Ca  labs 8/2021 24 hour urine Ca 147  Labs 11/12/21 Gluc 106 Cr 0.93, GFR 89 Ca 8.8, iPTH 13, Mg 1.8, Phos 4.2 TSH 1.69, Ft4 1.8 25 vit D 30 1,25 vit D 12  Labs 3/8/22 Gluc 102, A1c 5.2 Cr 0.92, GFR 89 Ca 8.7, alb 4.4 TSH 1.56, Ft4 1.8  Labs 7/1/22 Gluc 108 Cr 0.96, GFR 85 TSH 1.95, FT4 1.7 Ca 8.7, alb 4.1  Labs 7/14/22 24 hours urine calcium 304  Labs 1/5/23 gluc 103, A1c 5.2 TSH 2.18, FT4 1.7 Ca 9.0, alb 4.2  Labs 7/13/23 Gluc 105, A1c 5.2 Ca 8.6, alb 4.2 GFR 97 24 hour urine ca 278 TSH 2.24, Ft4 1,4  Labs 2/23/24 Gluc 104, A1c 5.3 Cr 0.90, GFR 96 Ca 8.9, alb 4.3 TSH 1.64

## 2024-03-01 NOTE — REASON FOR VISIT
[Follow - Up] : a follow-up visit [Hypothyroidism] : hypothyroidism [Hypocalcemia] : hypocalcemia [Other___] : [unfilled] [Weight Management/Obesity] : weight management/obesity

## 2024-03-01 NOTE — PHYSICAL EXAM
[Alert] : alert [Obese] : obese [EOMI] : extra ocular movement intact [No LAD] : no lymphadenopathy [No Thyroid Nodules] : no palpable thyroid nodules [No Respiratory Distress] : no respiratory distress [Clear to Auscultation] : lungs were clear to auscultation bilaterally [Normal S1, S2] : normal S1 and S2 [Normal Rate] : heart rate was normal [No Edema] : no peripheral edema [Not Tender] : non-tender [Soft] : abdomen soft [Normal Affect] : the affect was normal [Oriented x3] : oriented to person, place, and time [Normal Insight/Judgement] : insight and judgment were intact [Normal Mood] : the mood was normal

## 2024-03-01 NOTE — ASSESSMENT
[FreeTextEntry1] : 63 yr old female with:  1. Post surgical hypothyroid - clinically euthyroid, normal TSH - cont Synthroid 200 mcg daily - repeat TFTs 1 week before next visit  2. hypocalcemia due to hypoparathyroidism - goal Ca 8-8.5 to avoid hypercalciuria/stones.  - cont TUMS 1 tabs daily - cont Calcitriol 0.25 mcg BID  (he cannot tolerate higher doses) - cont to monitor labs, yearly 24 hour urine calcium   3. IFG with normal A1c- counseled pt on diet and exercise, low carb diet, cont to monitor  4. morbid obesity -  counseled pt on lifetstyle changes with diet and exercise. discussed various diet options - weight watcher, nutrisystem, calorie counting etc.  counseled pt on increased physical activity. he declined GLP1 agonist at this time

## 2024-03-01 NOTE — REVIEW OF SYSTEMS
[Tremors] : tremors [Fatigue] : no fatigue [Chest Pain] : no chest pain [Shortness Of Breath] : no shortness of breath [Nausea] : no nausea [SOB on Exertion] : no shortness of breath on exertion [Constipation] : no constipation [Abdominal Pain] : no abdominal pain [Diarrhea] : no diarrhea [Polyuria] : no polyuria [Nocturia] : no nocturia [Dizziness] : no dizziness [Headaches] : no headaches [Pain/Numbness of Digits] : no pain/numbness of digits [Polydipsia] : no polydipsia [FreeTextEntry2] : weight stable

## 2024-06-06 NOTE — REVIEW OF SYSTEMS
Detail Level: Zone [Recent Weight Gain (___ Lbs)] : recent weight gain: [unfilled] lbs [Constipation] : constipation [Heartburn] : heartburn [Gas/Bloating] : gas/bloating [Fatigue] : no fatigue [Blurred Vision] : no blurred vision [Chest Pain] : no chest pain [Palpitations] : no palpitations [Shortness Of Breath] : no shortness of breath [SOB on Exertion] : no shortness of breath on exertion [Nausea] : no nausea [Abdominal Pain] : no abdominal pain [Vomiting] : no vomiting [Diarrhea] : no diarrhea [Polyuria] : no polyuria [Nocturia] : no nocturia [Tremors] : no tremors [Depression] : no depression [Anxiety] : no anxiety [Polydipsia] : no polydipsia [FreeTextEntry9] : knee pain (chronic)

## 2024-08-30 LAB
HBA1C MFR BLD HPLC: 5.5
TSH SERPL-ACNC: 1.84

## 2024-09-03 ENCOUNTER — APPOINTMENT (OUTPATIENT)
Dept: ENDOCRINOLOGY | Facility: CLINIC | Age: 64
End: 2024-09-03
Payer: COMMERCIAL

## 2024-09-03 VITALS
DIASTOLIC BLOOD PRESSURE: 70 MMHG | BODY MASS INDEX: 44.1 KG/M2 | WEIGHT: 315 LBS | SYSTOLIC BLOOD PRESSURE: 132 MMHG | HEIGHT: 71 IN

## 2024-09-03 VITALS
WEIGHT: 315 LBS | BODY MASS INDEX: 44.1 KG/M2 | OXYGEN SATURATION: 97 % | HEIGHT: 71 IN | HEART RATE: 64 BPM | SYSTOLIC BLOOD PRESSURE: 132 MMHG | DIASTOLIC BLOOD PRESSURE: 70 MMHG

## 2024-09-03 DIAGNOSIS — E20.9 HYPOPARATHYROIDISM, UNSPECIFIED: ICD-10-CM

## 2024-09-03 DIAGNOSIS — E83.51 HYPOCALCEMIA: ICD-10-CM

## 2024-09-03 DIAGNOSIS — E66.01 MORBID (SEVERE) OBESITY DUE TO EXCESS CALORIES: ICD-10-CM

## 2024-09-03 DIAGNOSIS — R73.01 IMPAIRED FASTING GLUCOSE: ICD-10-CM

## 2024-09-03 DIAGNOSIS — E89.0 POSTPROCEDURAL HYPOTHYROIDISM: ICD-10-CM

## 2024-09-03 PROCEDURE — 99214 OFFICE O/P EST MOD 30 MIN: CPT

## 2024-09-03 NOTE — DATA REVIEWED
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 ====================================================== Labs 10/1/18 Cr 0.93 Calcium 8.6 TSH 1.29 FT4 1.6 A1c 5.2 Gluc 109  Labs 1/24/19 Cr 0.87 gluc 102, A1c 5.4 Ca 8.5, alb 4.2, Vit D 33 TSH 2.40, FT4 1.6  Labs 5/15/19 Ca 9, 25 vit D 28, ionized calcium 4.9,  TSH 4.06 A1c 5.4, gluc 100  Labs 10/15/19 Ca 9.1, ionised calcium 4.7, vit D 31 Cr 1.02, GFR 81 TSH 5.89, FT4 1.5 A1c 5.3, gluc 101  Labs 2/12/20 Gluc 103, A1c 5.4 Cr 1.00 Ca 8.9, Vit D 26, ionized calcium 4.8 TSH 3.30, FT4 1.8  Labs 6/17/20 Gluc 110, A1c 5.4 Cr 1.00, GFR 82 TSh 3.86, Ft4 1.7 Ca 9,iPTH 12, vt D 30, ionized ca 5.1, 1,25 vut D 22  Labs 11/25/20 Gluc 106, A1c 5.2 Cr 1.02, GFR 80 TSH 2.43, Ft4 1.7 vit A 64 vit D 32, Ca 9.1  Labs 3/19/21 A1c 5.2 TSH 3.20, Ft4 1.5 Vit D 27, ionized Ca 5.0 CBC ok, no CMP did not do 24 hour urine Ca  labs 8/2021 24 hour urine Ca 147  Labs 11/12/21 Gluc 106 Cr 0.93, GFR 89 Ca 8.8, iPTH 13, Mg 1.8, Phos 4.2 TSH 1.69, Ft4 1.8 25 vit D 30 1,25 vit D 12  Labs 3/8/22 Gluc 102, A1c 5.2 Cr 0.92, GFR 89 Ca 8.7, alb 4.4 TSH 1.56, Ft4 1.8  Labs 7/1/22 Gluc 108 Cr 0.96, GFR 85 TSH 1.95, FT4 1.7 Ca 8.7, alb 4.1  Labs 7/14/22 24 hours urine calcium 304  Labs 1/5/23 gluc 103, A1c 5.2 TSH 2.18, FT4 1.7 Ca 9.0, alb 4.2  Labs 7/13/23 Gluc 105, A1c 5.2 Ca 8.6, alb 4.2 GFR 97 24 hour urine ca 278 TSH 2.24, Ft4 1,4  Labs 2/23/24 Gluc 104, A1c 5.3 Cr 0.90, GFR 96 Ca 8.9, alb 4.3 TSH 1.64  Labs 8/28/24 Gluc 105, A1c 5.5 Cr 0.94, GFR 91 Ca 8.5 TSH 1.84, FT4 1.5

## 2024-09-03 NOTE — PHYSICAL EXAM
[Alert] : alert [Obese] : obese [EOMI] : extra ocular movement intact [No LAD] : no lymphadenopathy [No Thyroid Nodules] : no palpable thyroid nodules [Clear to Auscultation] : lungs were clear to auscultation bilaterally [Normal S1, S2] : normal S1 and S2 [Normal Rate] : heart rate was normal [No Edema] : no peripheral edema [Not Tender] : non-tender [Soft] : abdomen soft [Oriented x3] : oriented to person, place, and time [Normal Affect] : the affect was normal [Normal Insight/Judgement] : insight and judgment were intact [Normal Mood] : the mood was normal [Normal Rate and Effort] : normal respiratory rate and effort

## 2024-09-03 NOTE — REVIEW OF SYSTEMS
[Fatigue] : no fatigue [Chest Pain] : no chest pain [Palpitations] : no palpitations [Shortness Of Breath] : no shortness of breath [SOB on Exertion] : no shortness of breath on exertion [Constipation] : no constipation [Diarrhea] : no diarrhea [Polyuria] : no polyuria [Nocturia] : no nocturia [Headaches] : no headaches [Dizziness] : no dizziness [Pain/Numbness of Digits] : no pain/numbness of digits [Polydipsia] : no polydipsia [FreeTextEntry2] : weight stable

## 2024-09-03 NOTE — HISTORY OF PRESENT ILLNESS
[FreeTextEntry1] : Interval Hx - no issues today.  Quality: post surgical hypothyroid, post surgical hypoparathyroidism Duration: 2014 Onset: thyroid nodules on physical exam, Large Left thyroid nodule 7 cm with tracheal deviation, s/p total thyroidectomy 2015 Severity: mild Associated symptoms: . see ROS MODIFYING FACTORS: improved with meds Current medication: TUMS 750 mg 1 tab daily Calcitriol  0.25 mcg BID  Synthroid (TAD) 200 mcg daily on an empty stomach ------------------------------------------------------------- Obesity - weight stable since last visit, diet unchanged

## 2025-03-05 ENCOUNTER — APPOINTMENT (OUTPATIENT)
Dept: ENDOCRINOLOGY | Facility: CLINIC | Age: 65
End: 2025-03-05
Payer: COMMERCIAL

## 2025-03-05 ENCOUNTER — NON-APPOINTMENT (OUTPATIENT)
Age: 65
End: 2025-03-05

## 2025-03-05 VITALS
HEIGHT: 71 IN | HEART RATE: 78 BPM | SYSTOLIC BLOOD PRESSURE: 110 MMHG | DIASTOLIC BLOOD PRESSURE: 74 MMHG | WEIGHT: 315 LBS | OXYGEN SATURATION: 98 % | BODY MASS INDEX: 44.1 KG/M2

## 2025-03-05 DIAGNOSIS — E89.0 POSTPROCEDURAL HYPOTHYROIDISM: ICD-10-CM

## 2025-03-05 DIAGNOSIS — R73.01 IMPAIRED FASTING GLUCOSE: ICD-10-CM

## 2025-03-05 DIAGNOSIS — E66.01 MORBID (SEVERE) OBESITY DUE TO EXCESS CALORIES: ICD-10-CM

## 2025-03-05 DIAGNOSIS — E20.9 HYPOPARATHYROIDISM, UNSPECIFIED: ICD-10-CM

## 2025-03-05 DIAGNOSIS — E83.51 HYPOCALCEMIA: ICD-10-CM

## 2025-03-05 PROCEDURE — 99214 OFFICE O/P EST MOD 30 MIN: CPT

## 2025-09-09 ENCOUNTER — APPOINTMENT (OUTPATIENT)
Dept: ENDOCRINOLOGY | Facility: CLINIC | Age: 65
End: 2025-09-09

## 2025-09-09 VITALS
DIASTOLIC BLOOD PRESSURE: 70 MMHG | HEART RATE: 80 BPM | OXYGEN SATURATION: 98 % | HEIGHT: 71 IN | SYSTOLIC BLOOD PRESSURE: 116 MMHG | WEIGHT: 315 LBS | BODY MASS INDEX: 44.1 KG/M2

## 2025-09-09 DIAGNOSIS — E83.51 HYPOCALCEMIA: ICD-10-CM

## 2025-09-09 DIAGNOSIS — E66.01 MORBID (SEVERE) OBESITY DUE TO EXCESS CALORIES: ICD-10-CM

## 2025-09-09 DIAGNOSIS — R73.01 IMPAIRED FASTING GLUCOSE: ICD-10-CM

## 2025-09-09 DIAGNOSIS — E89.0 POSTPROCEDURAL HYPOTHYROIDISM: ICD-10-CM

## 2025-09-09 DIAGNOSIS — E20.9 HYPOPARATHYROIDISM, UNSPECIFIED: ICD-10-CM

## 2025-09-09 PROCEDURE — G2211 COMPLEX E/M VISIT ADD ON: CPT | Mod: NC

## 2025-09-09 PROCEDURE — 99214 OFFICE O/P EST MOD 30 MIN: CPT
